# Patient Record
Sex: FEMALE | Race: WHITE | Employment: OTHER | URBAN - METROPOLITAN AREA
[De-identification: names, ages, dates, MRNs, and addresses within clinical notes are randomized per-mention and may not be internally consistent; named-entity substitution may affect disease eponyms.]

---

## 2020-06-26 ENCOUNTER — HOSPITAL ENCOUNTER (OUTPATIENT)
Dept: GENERAL RADIOLOGY | Facility: HOSPITAL | Age: 80
Discharge: HOME | End: 2020-06-26
Attending: PHYSICAL MEDICINE & REHABILITATION
Payer: MEDICARE

## 2020-06-26 ENCOUNTER — OFFICE VISIT (OUTPATIENT)
Dept: NEUROLOGY | Facility: CLINIC | Age: 80
End: 2020-06-26
Payer: MEDICARE

## 2020-06-26 VITALS — HEIGHT: 68 IN | WEIGHT: 185 LBS | BODY MASS INDEX: 28.04 KG/M2

## 2020-06-26 DIAGNOSIS — M53.3 SI (SACROILIAC) JOINT DYSFUNCTION: ICD-10-CM

## 2020-06-26 DIAGNOSIS — I10 ESSENTIAL HYPERTENSION: ICD-10-CM

## 2020-06-26 DIAGNOSIS — M47.816 LUMBAR SPONDYLOSIS: ICD-10-CM

## 2020-06-26 DIAGNOSIS — M53.3 SI (SACROILIAC) JOINT DYSFUNCTION: Primary | ICD-10-CM

## 2020-06-26 DIAGNOSIS — M51.37 DDD (DEGENERATIVE DISC DISEASE), LUMBOSACRAL: ICD-10-CM

## 2020-06-26 DIAGNOSIS — G62.9 NEUROPATHY: ICD-10-CM

## 2020-06-26 DIAGNOSIS — M54.59 MECHANICAL LOW BACK PAIN: ICD-10-CM

## 2020-06-26 DIAGNOSIS — M79.10 MYALGIA: ICD-10-CM

## 2020-06-26 PROCEDURE — 72110 X-RAY EXAM L-2 SPINE 4/>VWS: CPT | Performed by: PHYSICAL MEDICINE & REHABILITATION

## 2020-06-26 PROCEDURE — 99204 OFFICE O/P NEW MOD 45 MIN: CPT | Performed by: PHYSICAL MEDICINE & REHABILITATION

## 2020-06-26 RX ORDER — METOPROLOL SUCCINATE 50 MG/1
1 TABLET, EXTENDED RELEASE ORAL DAILY
COMMUNITY

## 2020-06-26 RX ORDER — VALSARTAN AND HYDROCHLOROTHIAZIDE 160; 25 MG/1; MG/1
1 TABLET ORAL DAILY
COMMUNITY

## 2020-06-26 RX ORDER — ASPIRIN 81 MG/1
81 TABLET ORAL DAILY
COMMUNITY

## 2020-06-26 RX ORDER — GABAPENTIN 100 MG/1
2 CAPSULE ORAL 2 TIMES DAILY
COMMUNITY

## 2020-06-26 RX ORDER — HYDROCODONE BITARTRATE AND ACETAMINOPHEN 10; 325 MG/1; MG/1
1 TABLET ORAL AS NEEDED
COMMUNITY

## 2020-06-26 NOTE — H&P
[address] [place] [address] H&P    Requesting Physician: Dr. Dunlap July    Chief Complaint (Reason for Visit):  Patient presents with:  Back Pain: Patient presents today c/o pain in left lower back going down the back HYSTERECTOMY     • REPAIR COMPL ROTATOR CUFF AVULSN,CHR Bilateral         FAMILY HISTORY:   History reviewed. No pertinent family history.     SOCIAL HISTORY:   Social History    Occupational History      Not on file    Tobacco Use      Smoking status: [facility] follow 2 step commands, comprehention intact, spontaneous speech intact  Motor:    Musculoskeletal:    LUMBAR SPINE:  Inspection: no erythema, swelling, or obvious deformity.   Their iliac crest and shoulder heights are symmetrical.  Postural exam reveals k were provided as documented in AVS summary. The patient was in agreement with the assessment and plan. All questions were answered. There were no barriers to learning.        SI (sacroiliac) joint dysfunction  (primary encounter diagnosis)  Myalgia  Mech

## 2020-06-26 NOTE — PATIENT INSTRUCTIONS
1) Continue ibuprofen 2 tablets daily for pain and use Norco as needed.    2) Begin formal physical therapy at the [facility]  3) Get XR of the lumbar spine on your way out  4) My office will call you to schedule the LEFT SI joint and myofas

## 2020-06-29 ENCOUNTER — TELEPHONE (OUTPATIENT)
Dept: NEUROLOGY | Facility: CLINIC | Age: 80
End: 2020-06-29

## 2020-06-29 NOTE — TELEPHONE ENCOUNTER
[employer] for authorization of approval for LEFT SI joint and myofacial trigger point CSI under ultrasound guidance cpt codes , 14805. Authorization is not required. Will inform Nursing.

## 2020-07-15 ENCOUNTER — OFFICE VISIT (OUTPATIENT)
Dept: PHYSICAL THERAPY | Facility: HOSPITAL | Age: 80
End: 2020-07-15
Attending: PHYSICAL MEDICINE & REHABILITATION
Payer: MEDICARE

## 2020-07-15 DIAGNOSIS — G62.9 NEUROPATHY: ICD-10-CM

## 2020-07-15 DIAGNOSIS — M53.3 SI (SACROILIAC) JOINT DYSFUNCTION: ICD-10-CM

## 2020-07-15 DIAGNOSIS — M54.59 MECHANICAL LOW BACK PAIN: ICD-10-CM

## 2020-07-15 DIAGNOSIS — M51.37 DDD (DEGENERATIVE DISC DISEASE), LUMBOSACRAL: ICD-10-CM

## 2020-07-15 DIAGNOSIS — M79.10 MYALGIA: ICD-10-CM

## 2020-07-15 DIAGNOSIS — M47.816 LUMBAR SPONDYLOSIS: ICD-10-CM

## 2020-07-15 DIAGNOSIS — I10 ESSENTIAL HYPERTENSION: ICD-10-CM

## 2020-07-15 PROCEDURE — 97162 PT EVAL MOD COMPLEX 30 MIN: CPT

## 2020-07-15 PROCEDURE — 97110 THERAPEUTIC EXERCISES: CPT

## 2020-07-15 NOTE — PROGRESS NOTES
LUMBAR SPINE EVALUATION:   Referring Physician: Dr. Priscilla Momin  Diagnosis: SI (sacroiliac) joint dysfunction (M53.3)  Myalgia (M79.10)  Mechanical low back pain (M54.5)  Neuropathy (G62.9)  Essential hypertension (I10)  Lumbar spondylosis (M47.816)  DDD (de levoscoliosis mid lumbar spine. Chronic moderately severe compression deformity of L1 with stable retro listhesis of L1 in relation L2. Scattered spondylosis.   Mild anterolisthesis of L4 in relation to L5 which worsens during flexion and     Screening Marlo Goltz Major Limitation: 75+% Limited Moderate Limitation: 50% Limited Min Limitation: 25% Limited No Limitation Comments   Flexion X       Extension  X      R Side Glide  X      L Side Glide  X                Myotomes Left Right   Hip Flexion (L1-2) 4+/5 4/5   Q factors/comorbidities, 4+ body structures involved/activity limitations, and evolving symptoms including changing pain levels. PLAN OF CARE:    Goals: To be met in 90 days  1.  Colin Kingsley will demonstrate score of 10/100 on Oswestry to demonstrate retu

## 2020-07-17 ENCOUNTER — OFFICE VISIT (OUTPATIENT)
Dept: PHYSICAL THERAPY | Facility: HOSPITAL | Age: 80
End: 2020-07-17
Attending: PHYSICAL MEDICINE & REHABILITATION
Payer: MEDICARE

## 2020-07-17 PROCEDURE — 97110 THERAPEUTIC EXERCISES: CPT

## 2020-07-17 NOTE — PROGRESS NOTES
Dx: SI (sacroiliac) joint dysfunction (M53.3)  Myalgia (M79.10)  Mechanical low back pain (M54.5)  Neuropathy (G62.9)  Essential hypertension (I10)  Lumbar spondylosis (M47.816)  DDD (degenerative disc disease), lumbosacral (M51.37)     Authorized # of [employer] will demonstrate score of 10/100 on Oswestry to demonstrate return to maximum functional performance. 2. Paolo Humphries will report she is able to ascend/descend the stairs in a recipricol pattern to improve safety and decrease fall risk.   [address]

## 2020-07-20 ENCOUNTER — TELEPHONE (OUTPATIENT)
Dept: NEUROLOGY | Facility: CLINIC | Age: 80
End: 2020-07-20

## 2020-07-20 ENCOUNTER — OFFICE VISIT (OUTPATIENT)
Dept: PHYSICAL THERAPY | Facility: HOSPITAL | Age: 80
End: 2020-07-20
Attending: PHYSICAL MEDICINE & REHABILITATION
Payer: MEDICARE

## 2020-07-20 PROCEDURE — 97140 MANUAL THERAPY 1/> REGIONS: CPT

## 2020-07-20 PROCEDURE — 97110 THERAPEUTIC EXERCISES: CPT

## 2020-07-20 NOTE — PROGRESS NOTES
Dx: SI (sacroiliac) joint dysfunction (M53.3)  Myalgia (M79.10)  Mechanical low back pain (M54.5)  Neuropathy (G62.9)  Essential hypertension (I10)  Lumbar spondylosis (M47.816)  DDD (degenerative disc disease), lumbosacral (M51.37)     Authorized # of [employer] fall risk. [address] will report she is able to sit for 20 mins or longer with 2/10 pain or less to improve driving performance.     Plan:  Continue with current poc with focus on lumbar and SI interventions    Charges: Kalen 2 manual 1 Total Timed Alexandro

## 2020-07-21 NOTE — TELEPHONE ENCOUNTER
Spoke to patient. She states that for the past 6-7 weeks she has been having pain in left shoulder. She denies injury. She has not been evaluated by Dr. Jose Sharpe for her shoulder. She states that previously she did see ortho but it has been a while.  Explained

## 2020-07-23 ENCOUNTER — OFFICE VISIT (OUTPATIENT)
Dept: PHYSICAL THERAPY | Facility: HOSPITAL | Age: 80
End: 2020-07-23
Attending: PHYSICAL MEDICINE & REHABILITATION
Payer: MEDICARE

## 2020-07-23 PROCEDURE — 97140 MANUAL THERAPY 1/> REGIONS: CPT

## 2020-07-23 PROCEDURE — 97110 THERAPEUTIC EXERCISES: CPT

## 2020-07-27 ENCOUNTER — OFFICE VISIT (OUTPATIENT)
Dept: PHYSICAL THERAPY | Facility: HOSPITAL | Age: 80
End: 2020-07-27
Attending: PHYSICAL MEDICINE & REHABILITATION
Payer: MEDICARE

## 2020-07-27 ENCOUNTER — OFFICE VISIT (OUTPATIENT)
Dept: NEUROLOGY | Facility: CLINIC | Age: 80
End: 2020-07-27
Payer: MEDICARE

## 2020-07-27 DIAGNOSIS — M54.59 MECHANICAL LOW BACK PAIN: ICD-10-CM

## 2020-07-27 DIAGNOSIS — M79.10 MYALGIA: ICD-10-CM

## 2020-07-27 DIAGNOSIS — M47.816 LUMBAR SPONDYLOSIS: ICD-10-CM

## 2020-07-27 DIAGNOSIS — M51.37 DDD (DEGENERATIVE DISC DISEASE), LUMBOSACRAL: ICD-10-CM

## 2020-07-27 DIAGNOSIS — M53.3 SI (SACROILIAC) JOINT DYSFUNCTION: Primary | ICD-10-CM

## 2020-07-27 DIAGNOSIS — G62.9 NEUROPATHY: ICD-10-CM

## 2020-07-27 DIAGNOSIS — I10 ESSENTIAL HYPERTENSION: ICD-10-CM

## 2020-07-27 PROCEDURE — 20553 NJX 1/MLT TRIGGER POINTS 3/>: CPT | Performed by: PHYSICAL MEDICINE & REHABILITATION

## 2020-07-27 PROCEDURE — 97140 MANUAL THERAPY 1/> REGIONS: CPT

## 2020-07-27 PROCEDURE — 97110 THERAPEUTIC EXERCISES: CPT

## 2020-07-27 PROCEDURE — 76942 ECHO GUIDE FOR BIOPSY: CPT | Performed by: PHYSICAL MEDICINE & REHABILITATION

## 2020-07-27 RX ORDER — LIDOCAINE HYDROCHLORIDE 10 MG/ML
2.5 INJECTION, SOLUTION INFILTRATION; PERINEURAL ONCE
Status: COMPLETED | OUTPATIENT
Start: 2020-07-27 | End: 2020-07-27

## 2020-07-27 RX ORDER — LIDOCAINE HYDROCHLORIDE 10 MG/ML
4 INJECTION, SOLUTION INFILTRATION; PERINEURAL ONCE
Status: COMPLETED | OUTPATIENT
Start: 2020-07-27 | End: 2020-07-27

## 2020-07-27 RX ORDER — TRIAMCINOLONE ACETONIDE 40 MG/ML
40 INJECTION, SUSPENSION INTRA-ARTICULAR; INTRAMUSCULAR ONCE
Status: COMPLETED | OUTPATIENT
Start: 2020-07-27 | End: 2020-07-27

## 2020-07-27 NOTE — PROCEDURES
[address]   Sacroiliac Joint and Myofascial Trigger P  oint Injection Procedure Note    CHIEF COMPLAINT:  Patient presents with:   Injection: Pt presents for Left SI joint steroid injection and myofascial tr removed and hemostasis obtained. The patient tolerated the procedure well and was able to report that the pain decreased from a 4/10 preprocedure to a 0/10 postprocedure in the SI joint. Patient verbalized understanding of assessment and plan.

## 2020-07-27 NOTE — PROGRESS NOTES
Dx: SI (sacroiliac) joint dysfunction (M53.3)  Myalgia (M79.10)  Mechanical low back pain (M54.5)  Neuropathy (G62.9)  Essential hypertension (I10)  Lumbar spondylosis (M47.816)  DDD (degenerative disc disease), lumbosacral (M51.37)     Authorized # of [employer] Tianna Muir will report she is able to ascend/descend the stairs in a recipricol pattern to improve safety and decrease fall risk. [address] will report she is able to sit for 20 mins or longer with 2/10 pain or less to improve driving performance.     Plan

## 2020-07-30 ENCOUNTER — OFFICE VISIT (OUTPATIENT)
Dept: PHYSICAL THERAPY | Facility: HOSPITAL | Age: 80
End: 2020-07-30
Attending: PHYSICAL MEDICINE & REHABILITATION
Payer: MEDICARE

## 2020-07-30 PROCEDURE — 97110 THERAPEUTIC EXERCISES: CPT

## 2020-07-30 NOTE — PROGRESS NOTES
Dx: SI (sacroiliac) joint dysfunction (M53.3)  Myalgia (M79.10)  Mechanical low back pain (M54.5)  Neuropathy (G62.9)  Essential hypertension (I10)  Lumbar spondylosis (M47.816)  DDD (degenerative disc disease), lumbosacral (M51.37)     Authorized # of [employer] Pam Freire will report she is able to ascend/descend the stairs in a recipricol pattern to improve safety and decrease fall risk. [address] will report she is able to sit for 20 mins or longer with 2/10 pain or less to improve driving performance.     Pl

## 2020-08-03 ENCOUNTER — APPOINTMENT (OUTPATIENT)
Dept: PHYSICAL THERAPY | Facility: HOSPITAL | Age: 80
End: 2020-08-03
Attending: PHYSICAL MEDICINE & REHABILITATION
Payer: MEDICARE

## 2020-08-05 ENCOUNTER — OFFICE VISIT (OUTPATIENT)
Dept: PHYSICAL THERAPY | Facility: HOSPITAL | Age: 80
End: 2020-08-05
Attending: PHYSICAL MEDICINE & REHABILITATION
Payer: MEDICARE

## 2020-08-05 PROCEDURE — 97110 THERAPEUTIC EXERCISES: CPT

## 2020-08-05 NOTE — PROGRESS NOTES
Dx: SI (sacroiliac) joint dysfunction (M53.3)  Myalgia (M79.10)  Mechanical low back pain (M54.5)  Neuropathy (G62.9)  Essential hypertension (I10)  Lumbar spondylosis (M47.816)  DDD (degenerative disc disease), lumbosacral (M51.37)     Authorized # of [employer] return to maximum functional performance. 2. Nell  will report she is able to ascend/descend the stairs in a recipricol pattern to improve safety and decrease fall risk.   [address] will report she is able to sit for 20 mins or longer with 2/

## 2020-08-07 ENCOUNTER — OFFICE VISIT (OUTPATIENT)
Dept: PHYSICAL THERAPY | Facility: HOSPITAL | Age: 80
End: 2020-08-07
Attending: PHYSICAL MEDICINE & REHABILITATION
Payer: MEDICARE

## 2020-08-07 PROCEDURE — 97110 THERAPEUTIC EXERCISES: CPT

## 2020-08-07 NOTE — PROGRESS NOTES
Dx: SI (sacroiliac) joint dysfunction (M53.3)  Myalgia (M79.10)  Mechanical low back pain (M54.5)  Neuropathy (G62.9)  Essential hypertension (I10)  Lumbar spondylosis (M47.816)  DDD (degenerative disc disease), lumbosacral (M51.37)     Authorized # of Juan attempt progression next session if her RLE pain stays improved. Rena Edouard reports less grabbing at her L buttock and that her SI pain has improved. Goals: To be met in 90 days  1.  Rena Edouard will demonstrate score of 10/100 on Oswestry to selinat

## 2020-08-10 ENCOUNTER — APPOINTMENT (OUTPATIENT)
Dept: PHYSICAL THERAPY | Facility: HOSPITAL | Age: 80
End: 2020-08-10
Attending: PHYSICAL MEDICINE & REHABILITATION
Payer: MEDICARE

## 2020-08-10 ENCOUNTER — MED REC SCAN ONLY (OUTPATIENT)
Dept: NEUROLOGY | Facility: CLINIC | Age: 80
End: 2020-08-10

## 2020-08-11 ENCOUNTER — APPOINTMENT (OUTPATIENT)
Dept: PHYSICAL THERAPY | Facility: HOSPITAL | Age: 80
End: 2020-08-11
Attending: PHYSICAL MEDICINE & REHABILITATION
Payer: MEDICARE

## 2020-08-13 ENCOUNTER — OFFICE VISIT (OUTPATIENT)
Dept: PHYSICAL THERAPY | Facility: HOSPITAL | Age: 80
End: 2020-08-13
Attending: PHYSICAL MEDICINE & REHABILITATION
Payer: MEDICARE

## 2020-08-13 PROCEDURE — 97110 THERAPEUTIC EXERCISES: CPT

## 2020-08-13 NOTE — PROGRESS NOTES
Dx: SI (sacroiliac) joint dysfunction (M53.3)  Myalgia (M79.10)  Mechanical low back pain (M54.5)  Neuropathy (G62.9)  Essential hypertension (I10)  Lumbar spondylosis (M47.816)  DDD (degenerative disc disease), lumbosacral (M51.37)     Authorized # of [employer] Day    BRIDGING ELASTIC BAND ABDUCTION -  Repeat 15 Times, Hold 1 Second(s), Complete 2 Sets, Perform 1 Times a Day    Sit to Stand -  Repeat 10 Times, Hold 1 Second(s), Complete 2 Sets, Perform 1 Times a Day    Assessment:   Madelyn Rodriguez reports positive

## 2020-08-18 ENCOUNTER — OFFICE VISIT (OUTPATIENT)
Dept: PHYSICAL THERAPY | Facility: HOSPITAL | Age: 80
End: 2020-08-18
Attending: PHYSICAL MEDICINE & REHABILITATION
Payer: MEDICARE

## 2020-08-18 PROCEDURE — 97110 THERAPEUTIC EXERCISES: CPT

## 2020-08-18 NOTE — PROGRESS NOTES
LUMBAR SPINE EVALUATION:   Referring Physician: Dr. Bernard Body  Diagnosis: SI (sacroiliac) joint dysfunction (M53.3)  Myalgia (M79.10)  Mechanical low back pain (M54.5)  Neuropathy (G62.9)  Essential hypertension (I10)  Lumbar spondylosis (M47.816)  DDD (de mins (current no change), pressure on posterior thigh with sitting (current no change)   Disturbed Sleep: Pt denies      Past Medical History:   Diagnosis Date   • Essential hypertension    • Neuropathy      Past Surgical History:   Procedure Laterality Da deformity  Lateral Shift: None noted  Correction of Posture: Better    Gait Deviations: R trunk lean, forward FLXed posture, decreased gait speed    AROM Major Limitation: 75+% Limited Moderate Limitation: 50% Limited Min Limitation: 25% Limited No Limitat Times a Day    Charges:  Therex 3  Total Timed Treatment: 45 min     Total Treatment Time: 45 min     Based on clinical rationale and outcome measures, this evaluation involved Moderate Complexity decision making due to 3+ personal factors/comorbidities, 4+

## 2020-09-10 ENCOUNTER — TELEPHONE (OUTPATIENT)
Dept: NEUROLOGY | Facility: CLINIC | Age: 80
End: 2020-09-10

## 2020-09-10 ENCOUNTER — OFFICE VISIT (OUTPATIENT)
Dept: NEUROLOGY | Facility: CLINIC | Age: 80
End: 2020-09-10
Payer: MEDICARE

## 2020-09-10 VITALS — WEIGHT: 185 LBS | BODY MASS INDEX: 28.04 KG/M2 | HEIGHT: 68 IN

## 2020-09-10 DIAGNOSIS — M53.3 SI (SACROILIAC) JOINT DYSFUNCTION: Primary | ICD-10-CM

## 2020-09-10 DIAGNOSIS — M54.59 MECHANICAL LOW BACK PAIN: ICD-10-CM

## 2020-09-10 DIAGNOSIS — M43.16 SPONDYLOLISTHESIS OF LUMBAR REGION: ICD-10-CM

## 2020-09-10 DIAGNOSIS — M51.26 BULGE OF LUMBAR DISC WITHOUT MYELOPATHY: ICD-10-CM

## 2020-09-10 DIAGNOSIS — M51.16 LUMBAR DISC HERNIATION WITH RADICULOPATHY: ICD-10-CM

## 2020-09-10 DIAGNOSIS — M47.816 FACET SYNDROME, LUMBAR: ICD-10-CM

## 2020-09-10 DIAGNOSIS — M47.816 LUMBAR SPONDYLOSIS: ICD-10-CM

## 2020-09-10 DIAGNOSIS — M48.061 LUMBAR FORAMINAL STENOSIS: ICD-10-CM

## 2020-09-10 DIAGNOSIS — M51.37 DDD (DEGENERATIVE DISC DISEASE), LUMBOSACRAL: ICD-10-CM

## 2020-09-10 DIAGNOSIS — M54.59 LUMBAR TRIGGER POINT SYNDROME: ICD-10-CM

## 2020-09-10 DIAGNOSIS — G62.9 NEUROPATHY: ICD-10-CM

## 2020-09-10 DIAGNOSIS — M79.10 MYALGIA: ICD-10-CM

## 2020-09-10 PROBLEM — M51.36 BULGE OF LUMBAR DISC WITHOUT MYELOPATHY: Status: ACTIVE | Noted: 2020-09-10

## 2020-09-10 PROCEDURE — 99214 OFFICE O/P EST MOD 30 MIN: CPT | Performed by: PHYSICAL MEDICINE & REHABILITATION

## 2020-09-10 PROCEDURE — 3008F BODY MASS INDEX DOCD: CPT | Performed by: PHYSICAL MEDICINE & REHABILITATION

## 2020-09-10 NOTE — PROGRESS NOTES
[address]  Progress Note    CHIEF COMPLAINT:  Patient presents with:  Low Back Pain: Pt states that shes following up after the procedure.  She also states that the injection didnt help with her pain       H daily.     • HYDROcodone-acetaminophen  MG Oral Tab 1 tablet as needed. • Metoprolol Succinate ER 50 MG Oral Tablet 24 Hr 1 tablet daily. • Valsartan-hydroCHLOROthiazide 160-25 MG Oral Tab 1 tablet daily.      • aspirin 81 MG Oral Tab EC Take lower extremities except 4 out of 5 with left-sided great toe extension (L5) and plantar flexion (S1)  Sensation: Intact to light touch in all dermatomes of the lower extremities except decreased sensation to light touch over the left L5  Reflexes: 1/4 at or extension. OTHER: Negative. Impression: CONCLUSION:   1. Mild levoscoliosis mid lumbar spine. Chronic moderately severe compression deformity of L1 with stable retro listhesis of L1 in relation L2. Scattered spondylosis.   Mild anteroli [place]

## 2020-09-10 NOTE — TELEPHONE ENCOUNTER
[employer] for authorization of approval for MRI L-spine wo cpt code 50003. Approval was given with Authorization Number: [id number] effective 09/10/20 to 03/09/21. Will call Pt. To inform. L/m advising of approval. Can proceed with scheduling appt.

## 2020-09-16 ENCOUNTER — HOSPITAL ENCOUNTER (OUTPATIENT)
Dept: MRI IMAGING | Facility: HOSPITAL | Age: 80
Discharge: HOME | End: 2020-09-16
Attending: PHYSICAL MEDICINE & REHABILITATION
Payer: MEDICARE

## 2020-09-16 DIAGNOSIS — G62.9 NEUROPATHY: ICD-10-CM

## 2020-09-16 DIAGNOSIS — M51.37 DDD (DEGENERATIVE DISC DISEASE), LUMBOSACRAL: ICD-10-CM

## 2020-09-16 DIAGNOSIS — M48.061 LUMBAR FORAMINAL STENOSIS: ICD-10-CM

## 2020-09-16 DIAGNOSIS — M53.3 SI (SACROILIAC) JOINT DYSFUNCTION: ICD-10-CM

## 2020-09-16 DIAGNOSIS — M47.816 LUMBAR SPONDYLOSIS: ICD-10-CM

## 2020-09-16 DIAGNOSIS — M47.816 FACET SYNDROME, LUMBAR: ICD-10-CM

## 2020-09-16 DIAGNOSIS — M54.59 MECHANICAL LOW BACK PAIN: ICD-10-CM

## 2020-09-16 DIAGNOSIS — M54.59 LUMBAR TRIGGER POINT SYNDROME: ICD-10-CM

## 2020-09-16 DIAGNOSIS — M79.10 MYALGIA: ICD-10-CM

## 2020-09-16 DIAGNOSIS — M51.16 LUMBAR DISC HERNIATION WITH RADICULOPATHY: ICD-10-CM

## 2020-09-16 DIAGNOSIS — M43.16 SPONDYLOLISTHESIS OF LUMBAR REGION: ICD-10-CM

## 2020-09-16 DIAGNOSIS — M51.26 BULGE OF LUMBAR DISC WITHOUT MYELOPATHY: ICD-10-CM

## 2020-09-16 PROCEDURE — 72148 MRI LUMBAR SPINE W/O DYE: CPT | Performed by: PHYSICAL MEDICINE & REHABILITATION

## 2020-09-22 ENCOUNTER — TELEPHONE (OUTPATIENT)
Dept: NEUROLOGY | Facility: CLINIC | Age: 80
End: 2020-09-22

## 2020-09-22 ENCOUNTER — OFFICE VISIT (OUTPATIENT)
Dept: NEUROLOGY | Facility: CLINIC | Age: 80
End: 2020-09-22
Payer: MEDICARE

## 2020-09-22 VITALS — BODY MASS INDEX: 28.04 KG/M2 | HEIGHT: 68 IN | WEIGHT: 185 LBS

## 2020-09-22 DIAGNOSIS — M54.59 LUMBAR TRIGGER POINT SYNDROME: ICD-10-CM

## 2020-09-22 DIAGNOSIS — M47.816 LUMBAR SPONDYLOSIS: ICD-10-CM

## 2020-09-22 DIAGNOSIS — M48.061 LUMBAR FORAMINAL STENOSIS: ICD-10-CM

## 2020-09-22 DIAGNOSIS — M47.816 FACET SYNDROME, LUMBAR: ICD-10-CM

## 2020-09-22 DIAGNOSIS — M79.10 MYALGIA: ICD-10-CM

## 2020-09-22 DIAGNOSIS — M51.16 LUMBAR DISC HERNIATION WITH RADICULOPATHY: Primary | ICD-10-CM

## 2020-09-22 DIAGNOSIS — M48.062 SPINAL STENOSIS OF LUMBAR REGION WITH NEUROGENIC CLAUDICATION: ICD-10-CM

## 2020-09-22 DIAGNOSIS — M54.59 MECHANICAL LOW BACK PAIN: ICD-10-CM

## 2020-09-22 DIAGNOSIS — M51.26 BULGE OF LUMBAR DISC WITHOUT MYELOPATHY: ICD-10-CM

## 2020-09-22 DIAGNOSIS — M51.37 DDD (DEGENERATIVE DISC DISEASE), LUMBOSACRAL: ICD-10-CM

## 2020-09-22 DIAGNOSIS — M43.16 SPONDYLOLISTHESIS OF LUMBAR REGION: ICD-10-CM

## 2020-09-22 PROCEDURE — 3008F BODY MASS INDEX DOCD: CPT | Performed by: PHYSICAL MEDICINE & REHABILITATION

## 2020-09-22 PROCEDURE — 99214 OFFICE O/P EST MOD 30 MIN: CPT | Performed by: PHYSICAL MEDICINE & REHABILITATION

## 2020-09-22 NOTE — TELEPHONE ENCOUNTER
Patient has been scheduled for a LEFt L5 and LEFt S1 TFESI under local anesthesia on 10/12/20 at the [facility]. Medications and allergies reviewed.  Patient informed to hold aspirins, nsaids, blood thinners, multivitamins, vitamin E and fish oils 3-7 days prior

## 2020-09-22 NOTE — TELEPHONE ENCOUNTER
[employer] for authorization of approval for LEFt L5 and LEFt S1 TFESI cpt codes G7122068, [id number], [id number]. Approval was given with Authorization Number: [id number] for one unit/DOS effective 09/22/20 to 03/21/21. Will inform Nursing.

## 2020-09-22 NOTE — PROGRESS NOTES
[address]  Progress Note    CHIEF COMPLAINT:  Patient presents with:  Low Back Pain: Pt states that shes here today to go ovwer MRI results       History of Present Illness:   The patient is a 79year old ri as needed. • Metoprolol Succinate ER 50 MG Oral Tablet 24 Hr 1 tablet daily. • Valsartan-hydroCHLOROthiazide 160-25 MG Oral Tab 1 tablet daily. • aspirin 81 MG Oral Tab EC Take 81 mg by mouth daily.          ALLERGIES:     Codeine extension (L5) and plantar flexion (S1)  Sensation: Intact to light touch in all dermatomes of the lower extremities except decreased sensation to light touch over the left L5  Reflexes: 1/4 at L4 and S1  Facet Loading: Pain with extension and rotation to moderate narrowing of the canal.  No increased marrow edema to suggest an acute fracture. CORD/CAUDA EQUINA: Normal caliber, contour, and signal intensity. OTHER: Negative.      LUMBAR DISC LEVELS:  L1-L2: Mild hypertrophic changes of the facet joints a posterior annular disc fissures at L4-L5 and L5-S1.      Dictated by (CST): Libia Urban MD on 9/17/2020 at 9:40 AM       Finalized by (CST): Libia Urban MD on 9/17/2020 at 10:05 AM              ASSESSMENT AND PLAN:  The patient is a 78-year-old female who

## 2020-09-22 NOTE — PATIENT INSTRUCTIONS
1) My office will call you to schedule the LEFT L5 and LEFT S1 TFESI under local anesthesia once the procedure is approved by your insurance carrier. 2) Follow up with me 2 weeks after the procedure. This can be done virtually.    3) If no improvement wi

## 2020-09-30 ENCOUNTER — TELEPHONE (OUTPATIENT)
Dept: NEUROLOGY | Facility: CLINIC | Age: 80
End: 2020-09-30

## 2020-10-12 ENCOUNTER — OFFICE VISIT (OUTPATIENT)
Dept: SURGERY | Facility: CLINIC | Age: 80
End: 2020-10-12

## 2020-10-12 DIAGNOSIS — M51.26 BULGE OF LUMBAR DISC WITHOUT MYELOPATHY: ICD-10-CM

## 2020-10-12 DIAGNOSIS — M47.816 LUMBAR SPONDYLOSIS: ICD-10-CM

## 2020-10-12 DIAGNOSIS — M51.37 DDD (DEGENERATIVE DISC DISEASE), LUMBOSACRAL: ICD-10-CM

## 2020-10-12 DIAGNOSIS — M51.16 LUMBAR DISC HERNIATION WITH RADICULOPATHY: Primary | ICD-10-CM

## 2020-10-12 DIAGNOSIS — M48.061 LUMBAR FORAMINAL STENOSIS: ICD-10-CM

## 2020-10-12 PROCEDURE — 64483 NJX AA&/STRD TFRM EPI L/S 1: CPT | Performed by: PHYSICAL MEDICINE & REHABILITATION

## 2020-10-12 PROCEDURE — 64484 NJX AA&/STRD TFRM EPI L/S EA: CPT | Performed by: PHYSICAL MEDICINE & REHABILITATION

## 2020-10-12 NOTE — PROCEDURES
[address]    2-LEVEL LUMBAR TRANSFORAMINAL   NAME:  Alejandro Ruiz    MR #:    [id number] :  1940     PHYSICIAN:  Heena Valera        Operative Report    DATE OF PROCEDURE: 10/12/2020   PREOPERATIVE DIAGNOSES: 1.  Lumbar disc he aspirated. Then, the patient was injected with a 4 cc solution of 2 cc of 6 mg/cc of Betamethasone and 2 cc of 1% PF lidocaine without epinephrine at each site. After this, the needles were removed. Each site was cleaned. Band-Aids were applied.   The p

## 2020-10-14 ENCOUNTER — TELEPHONE (OUTPATIENT)
Dept: NEUROLOGY | Facility: CLINIC | Age: 80
End: 2020-10-14

## 2020-10-14 NOTE — TELEPHONE ENCOUNTER
Patient calling back with an update. State still feeling some pain when walking and when moving the wrong way, gets the \"grabbing sensation\". But there is no radiating pain down the legs anymore.    If patient is not moving there is no pain and prior to i

## 2020-10-14 NOTE — TELEPHONE ENCOUNTER
Called patient for a follow up after injection on Monday 10/14/2020. Left a message to call the office back with an update how she is doing.

## 2020-10-27 ENCOUNTER — OFFICE VISIT (OUTPATIENT)
Dept: NEUROLOGY | Facility: CLINIC | Age: 80
End: 2020-10-27
Payer: MEDICARE

## 2020-10-27 VITALS — HEIGHT: 68 IN | BODY MASS INDEX: 28.04 KG/M2 | WEIGHT: 185 LBS

## 2020-10-27 DIAGNOSIS — M47.816 FACET SYNDROME, LUMBAR: ICD-10-CM

## 2020-10-27 DIAGNOSIS — M54.59 MECHANICAL LOW BACK PAIN: Primary | ICD-10-CM

## 2020-10-27 DIAGNOSIS — M51.37 DDD (DEGENERATIVE DISC DISEASE), LUMBOSACRAL: ICD-10-CM

## 2020-10-27 DIAGNOSIS — M51.26 BULGE OF LUMBAR DISC WITHOUT MYELOPATHY: ICD-10-CM

## 2020-10-27 DIAGNOSIS — M54.59 LUMBAR TRIGGER POINT SYNDROME: ICD-10-CM

## 2020-10-27 DIAGNOSIS — M48.061 LUMBAR FORAMINAL STENOSIS: ICD-10-CM

## 2020-10-27 DIAGNOSIS — G62.9 NEUROPATHY: ICD-10-CM

## 2020-10-27 DIAGNOSIS — M53.3 SI (SACROILIAC) JOINT DYSFUNCTION: ICD-10-CM

## 2020-10-27 DIAGNOSIS — M47.816 LUMBAR SPONDYLOSIS: ICD-10-CM

## 2020-10-27 DIAGNOSIS — M79.10 MYALGIA: ICD-10-CM

## 2020-10-27 DIAGNOSIS — M51.16 LUMBAR DISC HERNIATION WITH RADICULOPATHY: ICD-10-CM

## 2020-10-27 DIAGNOSIS — M43.16 SPONDYLOLISTHESIS OF LUMBAR REGION: ICD-10-CM

## 2020-10-27 DIAGNOSIS — M48.062 SPINAL STENOSIS OF LUMBAR REGION WITH NEUROGENIC CLAUDICATION: ICD-10-CM

## 2020-10-27 DIAGNOSIS — I10 ESSENTIAL HYPERTENSION: ICD-10-CM

## 2020-10-27 PROCEDURE — 3008F BODY MASS INDEX DOCD: CPT | Performed by: PHYSICAL MEDICINE & REHABILITATION

## 2020-10-27 PROCEDURE — 99214 OFFICE O/P EST MOD 30 MIN: CPT | Performed by: PHYSICAL MEDICINE & REHABILITATION

## 2020-10-27 RX ORDER — A/SINGAPORE/GP1908/2015 IVR-180 (AN A/MICHIGAN/45/2015 (H1N1)PDM09-LIKE VIRUS, A/HONG KONG/4801/2014, NYMC X-263B (H3N2) (AN A/HONG KONG/4801/2014-LIKE VIRUS), AND B/BRISBANE/60/2008, WILD TYPE (A B/BRISBANE/60/2008-LIKE VIRUS) 15; 15; 15 UG/.5ML; UG/.5ML; UG/.5ML
0.5 INJECTION, SUSPENSION INTRAMUSCULAR SEE ADMIN INSTRUCTIONS
COMMUNITY
Start: 2020-10-09

## 2020-10-27 NOTE — PATIENT INSTRUCTIONS
My office will call you to schedule the LEFt L2-L5 under local anesthesia once the procedure is approved by your insurance carrier. Follow up with me virtually the week after. If no improvement, then we will repeat the epidrual injection.  Depending on imp

## 2020-10-27 NOTE — PROGRESS NOTES
[address]  Progress Note    CHIEF COMPLAINT:  Patient presents with:  Low Back Pain: LOV 10/1220 Pt states that shes following up after the injection and she didnt get any relief from it       History of Pr into the muscle See Admin Instructions. UNTIL FINISHED     • gabapentin 100 MG Oral Cap 2 capsules 2 (two) times daily. • HYDROcodone-acetaminophen  MG Oral Tab 1 tablet as needed.      • Metoprolol Succinate ER 50 MG Oral Tablet 24 Hr 1 tablet da with pain during extension as well as extension rotation to the left  Motor: 5/5 in all myotomes of the BILATERAL lower extremities except 4 out of 5 with left-sided great toe extension (L5) and plantar flexion (S1)  Sensation: Intact to light touch in all chronic compression   deformity of the L1 vertebral body with 50-75% loss of vertebral body height.   There is 0.6 cm of chronic appearing posterior osseous retropulsion which results in moderate narrowing of the canal.  No increased marrow edema to suggest there is severe narrowing of the canal and bilateral neural foramen. This has progressed since 6/14/2012. Bony encroachment upon the exiting bilateral L5 nerve roots. Small posterior annular disc fissures at L4-L5 and L5-S1.      Dictated by (CST): Sotero Fisher MD  Physical Medicine and Rehabilitation/Sports Medicine  MEDICAL CENTER [facility]

## 2020-10-28 ENCOUNTER — TELEPHONE (OUTPATIENT)
Dept: NEUROLOGY | Facility: CLINIC | Age: 80
End: 2020-10-28

## 2020-10-28 NOTE — TELEPHONE ENCOUNTER
LEFT L2,L3,L4, L5 medial branch blocks under local anesthesia CPT Code: 29825,46987,28906- pending review    Called [employer], spoke to Linell Mcburney who states Dr. Pawan Cabral is out of network in the member plan, request above will pend till eligibility is verified w

## 2020-11-02 NOTE — TELEPHONE ENCOUNTER
LEFT L2,L3,L4, L5 medial branch blocks under local anesthesia- APPROVED  Authorization # [id number] effective 11/01/2020 thru 4/30/2021.  Will will inform TRAVIS approved referrals  Received a faxed determination letter for request. Sent to scanning

## 2020-11-03 NOTE — TELEPHONE ENCOUNTER
Patient has been scheduled for a LEFT L2,L3,L4, L5 medial branch blocks under local anesthesia on 11/10/20 at the [facility]. Medications and allergies reviewed.  Patient informed to hold aspirins, nsaids, blood thinners, multivitamins, vitamin E and fish oils 3-

## 2020-11-06 NOTE — TELEPHONE ENCOUNTER
Spoke to patient and re-scheduled  LEFT L2,L3,L4, L5 medial branch blocks under local anesthesia from 11/10/20 to 11/20/20. Patient aware of injections instructions.   Form re-faxed to [facility] and confirmed receipt with Chelsea Keen

## 2020-11-20 ENCOUNTER — OFFICE VISIT (OUTPATIENT)
Dept: SURGERY | Facility: CLINIC | Age: 80
End: 2020-11-20

## 2020-11-20 DIAGNOSIS — M48.061 LUMBAR FORAMINAL STENOSIS: ICD-10-CM

## 2020-11-20 DIAGNOSIS — M79.10 MYALGIA: ICD-10-CM

## 2020-11-20 DIAGNOSIS — M99.9 BIOMECHANICAL LESION: ICD-10-CM

## 2020-11-20 DIAGNOSIS — M51.16 LUMBAR DISC HERNIATION WITH RADICULOPATHY: ICD-10-CM

## 2020-11-20 DIAGNOSIS — M54.59 MECHANICAL LOW BACK PAIN: Primary | ICD-10-CM

## 2020-11-20 DIAGNOSIS — M51.26 BULGE OF LUMBAR DISC WITHOUT MYELOPATHY: ICD-10-CM

## 2020-11-20 DIAGNOSIS — M47.816 LUMBAR SPONDYLOSIS: ICD-10-CM

## 2020-11-20 DIAGNOSIS — M47.816 FACET SYNDROME, LUMBAR: ICD-10-CM

## 2020-11-20 DIAGNOSIS — M48.062 SPINAL STENOSIS OF LUMBAR REGION WITH NEUROGENIC CLAUDICATION: ICD-10-CM

## 2020-11-20 DIAGNOSIS — M51.37 DDD (DEGENERATIVE DISC DISEASE), LUMBOSACRAL: ICD-10-CM

## 2020-11-20 PROCEDURE — 64495 INJ PARAVERT F JNT L/S 3 LEV: CPT | Performed by: PHYSICAL MEDICINE & REHABILITATION

## 2020-11-20 PROCEDURE — 64494 INJ PARAVERT F JNT L/S 2 LEV: CPT | Performed by: PHYSICAL MEDICINE & REHABILITATION

## 2020-11-20 PROCEDURE — 64493 INJ PARAVERT F JNT L/S 1 LEV: CPT | Performed by: PHYSICAL MEDICINE & REHABILITATION

## 2020-11-20 NOTE — PROCEDURES
[address]    LUMBAR MEDIAL BRANCH BLOCK   NAME:  Evette Casper    MR #:    [id number] :  1940     PHYSICIAN:  Giana Lopez        Operative Report    DATE OF PROCEDURE: 2020   PREOPERATIVE DIAGNOSES: 1.  Mechanical low ba was inserted and directed to the above stated sites. When they felt to be in good position, oblique fluoroscopy was used to confirm needle placement at the eye of the Jhony dog each level.    At this time, Omnipaque 240 contrast was used to obtain a good

## 2020-11-23 ENCOUNTER — TELEPHONE (OUTPATIENT)
Dept: NEUROLOGY | Facility: CLINIC | Age: 80
End: 2020-11-23

## 2020-11-23 DIAGNOSIS — M53.3 SI (SACROILIAC) JOINT DYSFUNCTION: ICD-10-CM

## 2020-11-23 DIAGNOSIS — M43.16 SPONDYLOLISTHESIS OF LUMBAR REGION: ICD-10-CM

## 2020-11-23 DIAGNOSIS — M48.062 SPINAL STENOSIS OF LUMBAR REGION WITH NEUROGENIC CLAUDICATION: ICD-10-CM

## 2020-11-23 DIAGNOSIS — M48.061 LUMBAR FORAMINAL STENOSIS: ICD-10-CM

## 2020-11-23 DIAGNOSIS — M51.26 BULGE OF LUMBAR DISC WITHOUT MYELOPATHY: ICD-10-CM

## 2020-11-23 DIAGNOSIS — M54.59 MECHANICAL LOW BACK PAIN: Primary | ICD-10-CM

## 2020-11-23 DIAGNOSIS — M51.16 LUMBAR DISC HERNIATION WITH RADICULOPATHY: ICD-10-CM

## 2020-11-23 DIAGNOSIS — M47.816 FACET SYNDROME, LUMBAR: ICD-10-CM

## 2020-11-23 DIAGNOSIS — M79.10 MYALGIA: ICD-10-CM

## 2020-11-23 DIAGNOSIS — M51.37 DDD (DEGENERATIVE DISC DISEASE), LUMBOSACRAL: ICD-10-CM

## 2020-11-23 DIAGNOSIS — M54.59 LUMBAR TRIGGER POINT SYNDROME: ICD-10-CM

## 2020-11-23 DIAGNOSIS — I10 ESSENTIAL HYPERTENSION: ICD-10-CM

## 2020-11-23 DIAGNOSIS — G62.9 NEUROPATHY: ICD-10-CM

## 2020-11-23 DIAGNOSIS — M47.816 LUMBAR SPONDYLOSIS: ICD-10-CM

## 2020-11-23 NOTE — TELEPHONE ENCOUNTER
Injection procedure done 11/20/2020    Patient states she has 0% improvement    Patient states pain has not change in left leg. Pain when walking/sitting/lifting things. She uses ice to help with pain.  She states she uses Advil for pain and took 2 on Sun

## 2020-12-02 ENCOUNTER — TELEPHONE (OUTPATIENT)
Dept: NEUROLOGY | Facility: CLINIC | Age: 80
End: 2020-12-02

## 2020-12-02 NOTE — TELEPHONE ENCOUNTER
Called patient back to discuss outcomes of medial branch blocks. As she did not have any relief from the procedure, I would recommend a left L5 and left S1 transforaminal epidural steroid injection again.   If that does not provide any relief then I would

## 2020-12-02 NOTE — TELEPHONE ENCOUNTER
[facility] of approval for LEFT L5 and LEFT S1 TFESI cpt codes 31668-LN, 95798-WX, [id number]. Approval was given with Authorization Number: [id number] for one unit/DOS effective 12/02/20 to 05/31/21. Will inform Nursing.

## 2020-12-02 NOTE — TELEPHONE ENCOUNTER
Patient has been scheduled for a LEFT L5 and LEFT S1 TFESI under local anesthesia on 12/08/20 at the [facility]. Medications and allergies reviewed.  Patient informed we will need verbal or written authorization from patients Primary Care Physician/Cardiologist t

## 2020-12-03 NOTE — TELEPHONE ENCOUNTER
Patient states she was exposed to [employer] on thanksgiving. She was tested yesterday but is experiencing no symptoms. Patient will let us know her test results on Monday 12/07/20.

## 2020-12-08 ENCOUNTER — OFFICE VISIT (OUTPATIENT)
Dept: SURGERY | Facility: CLINIC | Age: 80
End: 2020-12-08

## 2020-12-08 DIAGNOSIS — M48.061 LUMBAR FORAMINAL STENOSIS: ICD-10-CM

## 2020-12-08 DIAGNOSIS — M51.16 LUMBAR DISC HERNIATION WITH RADICULOPATHY: ICD-10-CM

## 2020-12-08 DIAGNOSIS — M47.816 FACET SYNDROME, LUMBAR: ICD-10-CM

## 2020-12-08 DIAGNOSIS — M51.26 BULGE OF LUMBAR DISC WITHOUT MYELOPATHY: ICD-10-CM

## 2020-12-08 DIAGNOSIS — M79.10 MYALGIA: ICD-10-CM

## 2020-12-08 DIAGNOSIS — M48.062 SPINAL STENOSIS OF LUMBAR REGION WITH NEUROGENIC CLAUDICATION: ICD-10-CM

## 2020-12-08 DIAGNOSIS — M54.59 MECHANICAL LOW BACK PAIN: Primary | ICD-10-CM

## 2020-12-08 DIAGNOSIS — M51.37 DDD (DEGENERATIVE DISC DISEASE), LUMBOSACRAL: ICD-10-CM

## 2020-12-08 DIAGNOSIS — M99.9 BIOMECHANICAL LESION: ICD-10-CM

## 2020-12-08 DIAGNOSIS — M47.816 LUMBAR SPONDYLOSIS: ICD-10-CM

## 2020-12-08 PROCEDURE — 64484 NJX AA&/STRD TFRM EPI L/S EA: CPT | Performed by: PHYSICAL MEDICINE & REHABILITATION

## 2020-12-08 PROCEDURE — 64483 NJX AA&/STRD TFRM EPI L/S 1: CPT | Performed by: PHYSICAL MEDICINE & REHABILITATION

## 2020-12-08 NOTE — PROCEDURES
[address]    2-LEVEL LUMBAR TRANSFORAMINAL   NAME:  Sherra Spurling    MR #:    [id number] :  1940     PHYSICIAN:  Jairo Garcia        Operative Report    DATE OF PROCEDURE: 2020   PREOPERATIVE DIAGNOSES: 1.  Mechanical low to be in good position, AP fluoroscopy was used to advance the needles to the 6 o'clock position on the left L5 and left S1 pedicles.   At this point in time, Omnipaque-240 contrast was used to obtain a good epidurogram indicating correct needle placement a

## 2020-12-10 ENCOUNTER — TELEPHONE (OUTPATIENT)
Dept: NEUROLOGY | Facility: CLINIC | Age: 80
End: 2020-12-10

## 2020-12-10 NOTE — TELEPHONE ENCOUNTER
Spoke with patient and she stated that she has not noticed a difference since injection. Rates pain 5/10. Advised to give injection some time and to call the office if she finds symtoms worsening.

## 2020-12-11 ENCOUNTER — TELEPHONE (OUTPATIENT)
Dept: NEUROLOGY | Facility: CLINIC | Age: 80
End: 2020-12-11

## 2021-12-06 ENCOUNTER — APPOINTMENT (OUTPATIENT)
Dept: URBAN - METROPOLITAN AREA CLINIC 321 | Age: 81
Setting detail: DERMATOLOGY
End: 2021-12-06

## 2021-12-06 PROBLEM — D48.5 NEOPLASM OF UNCERTAIN BEHAVIOR OF SKIN: Status: ACTIVE | Noted: 2021-12-06

## 2021-12-06 PROCEDURE — 11104 PUNCH BX SKIN SINGLE LESION: CPT

## 2021-12-06 PROCEDURE — OTHER BIOPSY BY PUNCH METHOD: OTHER

## 2021-12-06 NOTE — PROCEDURE: BIOPSY BY PUNCH METHOD
Billing Type: Third-Party Bill
Information: Selecting Yes will display possible errors in your note based on the variables you have selected. This validation is only offered as a suggestion for you. PLEASE NOTE THAT THE VALIDATION TEXT WILL BE REMOVED WHEN YOU FINALIZE YOUR NOTE. IF YOU WANT TO FAX A PRELIMINARY NOTE YOU WILL NEED TO TOGGLE THIS TO 'NO' IF YOU DO NOT WANT IT IN YOUR FAXED NOTE.
Consent: Written consent was obtained and risks were reviewed including but not limited to scarring, infection, bleeding, scabbing, incomplete removal, nerve damage and allergy to anesthesia.
Render Path Notes In Note?: No
Notification Instructions: Patient will be notified of biopsy results. However, patient instructed to call the office if not contacted within 2 weeks.
Anesthesia Volume In Cc (Will Not Render If 0): 0.5
Anesthesia Type: 0.5% lidocaine with 1:200,000 epinephrine and a 1:10 solution of 8.4% sodium bicarbonate
Was A Bandage Applied: Yes
Size Of Lesion In Cm (Optional): 0
Wound Care: Petrolatum
Hemostasis: None
Home Suture Removal Text: Patient will remove their sutures at home.  If they have any questions or difficulties they will call the office.
Dressing: bandage
Epidermal Sutures: 6-0 Nylon
Suture Removal: 7 days
Detail Level: Detailed
Punch Size In Mm: 2
Post-Care Instructions: I reviewed with the patient in detail post-care instructions. Patient is to keep the biopsy site dry overnight, and then apply petrolatum twice daily until healed. Patient may apply hydrogen peroxide soaks to remove any crusting.
Biopsy Type: H and E

## 2021-12-13 ENCOUNTER — APPOINTMENT (OUTPATIENT)
Dept: URBAN - METROPOLITAN AREA CLINIC 321 | Age: 81
Setting detail: DERMATOLOGY
End: 2021-12-13

## 2021-12-13 PROBLEM — C44.311 BASAL CELL CARCINOMA OF SKIN OF NOSE: Status: ACTIVE | Noted: 2021-12-13

## 2021-12-13 PROCEDURE — 99214 OFFICE O/P EST MOD 30 MIN: CPT

## 2021-12-13 PROCEDURE — OTHER CONSULTATION FOR MOHS SURGERY: OTHER

## 2021-12-13 NOTE — PROCEDURE: CONSULTATION FOR MOHS SURGERY
Detail Level: Detailed
Incorporate Mauc In Note: No
Date Scheduled For Mohs (Optional): January 4, 2021
Body Location Override (Optional - Billing Will Still Be Based On Selected Body Map Location If Applicable): R nasal Ala
X Size Of Lesion In Cm (Optional): 0
VOMITING/NAUSEA

## 2022-01-04 ENCOUNTER — APPOINTMENT (OUTPATIENT)
Dept: URBAN - METROPOLITAN AREA CLINIC 321 | Age: 82
Setting detail: DERMATOLOGY
End: 2022-01-04

## 2022-01-04 PROBLEM — C44.311 BASAL CELL CARCINOMA OF SKIN OF NOSE: Status: ACTIVE | Noted: 2022-01-04

## 2022-01-04 PROCEDURE — 17311 MOHS 1 STAGE H/N/HF/G: CPT

## 2022-01-04 PROCEDURE — 15260 FTH/GFT FR N/E/E/L 20 SQCM/<: CPT

## 2022-01-04 PROCEDURE — 17312 MOHS ADDL STAGE: CPT

## 2022-01-04 PROCEDURE — OTHER MOHS SURGERY: OTHER

## 2022-01-11 ENCOUNTER — APPOINTMENT (OUTPATIENT)
Dept: URBAN - METROPOLITAN AREA CLINIC 321 | Age: 82
Setting detail: DERMATOLOGY
End: 2022-01-12

## 2022-01-11 DIAGNOSIS — Z48.02 ENCOUNTER FOR REMOVAL OF SUTURES: ICD-10-CM

## 2022-01-11 PROCEDURE — OTHER SUTURE REMOVAL (GLOBAL PERIOD): OTHER

## 2022-01-11 ASSESSMENT — LOCATION SIMPLE DESCRIPTION DERM
LOCATION SIMPLE: RIGHT NOSE
LOCATION SIMPLE: NECK

## 2022-01-11 ASSESSMENT — LOCATION DETAILED DESCRIPTION DERM
LOCATION DETAILED: RIGHT SUPERIOR LATERAL NECK
LOCATION DETAILED: RIGHT NASAL SIDEWALL

## 2022-01-11 ASSESSMENT — LOCATION ZONE DERM
LOCATION ZONE: NOSE
LOCATION ZONE: NECK

## 2022-01-11 NOTE — PROCEDURE: SUTURE REMOVAL (GLOBAL PERIOD)
Add 26481 Cpt? (Important Note: In 2017 The Use Of 62162 Is Being Tracked By Cms To Determine Future Global Period Reimbursement For Global Periods): no
Detail Level: Detailed

## 2022-04-04 ENCOUNTER — APPOINTMENT (OUTPATIENT)
Dept: URBAN - METROPOLITAN AREA CLINIC 244 | Age: 82
Setting detail: DERMATOLOGY
End: 2022-04-05

## 2022-04-04 DIAGNOSIS — L91.0 HYPERTROPHIC SCAR: ICD-10-CM

## 2022-04-04 DIAGNOSIS — L81.4 OTHER MELANIN HYPERPIGMENTATION: ICD-10-CM

## 2022-04-04 DIAGNOSIS — D22 MELANOCYTIC NEVI: ICD-10-CM

## 2022-04-04 PROBLEM — D22.39 MELANOCYTIC NEVI OF OTHER PARTS OF FACE: Status: ACTIVE | Noted: 2022-04-04

## 2022-04-04 PROCEDURE — OTHER COUNSELING: OTHER

## 2022-04-04 ASSESSMENT — LOCATION DETAILED DESCRIPTION DERM
LOCATION DETAILED: RIGHT NASAL ALA
LOCATION DETAILED: LEFT CENTRAL MALAR CHEEK
LOCATION DETAILED: RIGHT INFERIOR MEDIAL FOREHEAD

## 2022-04-04 ASSESSMENT — LOCATION SIMPLE DESCRIPTION DERM
LOCATION SIMPLE: RIGHT NOSE
LOCATION SIMPLE: RIGHT FOREHEAD
LOCATION SIMPLE: LEFT CHEEK

## 2022-04-04 ASSESSMENT — LOCATION ZONE DERM
LOCATION ZONE: FACE
LOCATION ZONE: NOSE

## 2022-04-04 NOTE — HPI: NON-MELANOMA SKIN CANCER F/U (HISTORY OF NMSC)
What Is The Reason For Today's Visit?: History of Non-Melanoma Skin Cancer
How Many Skin Cancers Have You Had?: one
When Was Your Last Cancer Diagnosed?: 12/6/2021

## 2023-08-30 ENCOUNTER — HOSPITAL ENCOUNTER (OUTPATIENT)
Age: 83
Discharge: HOME | End: 2023-08-30
Payer: MEDICARE

## 2023-08-30 VITALS
SYSTOLIC BLOOD PRESSURE: 146 MMHG | DIASTOLIC BLOOD PRESSURE: 79 MMHG | OXYGEN SATURATION: 96 % | TEMPERATURE: 98 F | RESPIRATION RATE: 20 BRPM | HEART RATE: 65 BPM

## 2023-08-30 DIAGNOSIS — M79.89 LEFT LEG SWELLING: Primary | ICD-10-CM

## 2023-08-30 PROCEDURE — 99203 OFFICE O/P NEW LOW 30 MIN: CPT

## 2023-08-30 PROCEDURE — 99202 OFFICE O/P NEW SF 15 MIN: CPT

## 2023-08-30 NOTE — ED INITIAL ASSESSMENT (HPI)
Patient reports 5-6 weeks of swelling of her left lower leg. Us done through her pcp. Was negative for dvt. Pcp treated her with antibiotics for phlebitis and symptoms did not improve. Saw vascular surgery on Friday who recommended a ct scan and doppler be done. Patient states she can not have these tests performed until September 22nd. States she has continued pain to her left lower leg and is warm red and swollen. Denies improvement in pain even after increasing her gabapentin dose.

## 2023-10-23 ENCOUNTER — ORDER TRANSCRIPTION (OUTPATIENT)
Dept: PHYSICAL THERAPY | Facility: HOSPITAL | Age: 83
End: 2023-10-23

## 2023-10-23 DIAGNOSIS — L03.90 CELLULITIS: Primary | ICD-10-CM

## 2023-11-06 ENCOUNTER — TELEPHONE (OUTPATIENT)
Dept: PHYSICAL THERAPY | Facility: HOSPITAL | Age: 83
End: 2023-11-06

## 2024-09-20 NOTE — PROCEDURE: MOHS SURGERY
alert Bilobed Transposition Flap Text: The defect edges were debeveled with a #15 scalpel blade.  Given the location of the defect and the proximity to free margins a bilobed transposition flap was deemed most appropriate.  Using a sterile surgical marker, an appropriate bilobe flap drawn around the defect.    The area thus outlined was incised deep to adipose tissue with a #15 scalpel blade.  The skin margins were undermined to an appropriate distance in all directions utilizing iris scissors.

## (undated) NOTE — LETTER
CATHRYN Notifier: Maulik/Programmr   MARI. Patient Name: Sheldon Goldmann. Identification Number: [id number]      Advance Beneficiary Notice of Noncoverage (ABN)  NOTE:  If Medicare doesn’t pay for D. item/service below, you may have to pay.   Medicare does not ? OPTION 2. I want the D. item/service listed above, but do not bill Medicare. You may ask to be paid now as I am responsible for payment. I cannot appeal if Medicare is not billed. ? OPTION 3. I don’t want the D. item/service listed above.  I understand

## (undated) NOTE — LETTER
[facility] SURGERY SCHEDULING FORM   [address] [place] [address]   [phone] (scheduling phone) [phone] (scheduling fax)     PATIENT INFORMATION   Last Name:      Carol Singleton      First Name:    Jarod Gee []  No or using our own   Allergies: Codeine         Completed by:    Rebeka Wells      Date:    12/2/2020

## (undated) NOTE — LETTER
[facility] SURGERY SCHEDULING FORM   [address] [facility]   [phone] (scheduling phone) [phone] (scheduling fax)     PATIENT INFORMATION   Last Name:      Dion Plaza      First Name:    Meaghan Dale Allergies: Codeine         Completed by:    Kia Berg      Date:    9/22/2020

## (undated) NOTE — LETTER
AUTHORIZATION FOR SURGICAL OPERATION OR OTHER PROCEDURE    1.  I hereby authorize Dr. Nata Cordova and the [facility] staff assigned to my case to perform the following operation and/or procedure at the [facility]:     LEFT SI joint and myofacial trigger poin Relationship to Patient:           []  Parent    Responsible person                          []  Spouse  In case of minor or                    [] Other  _____________   Incompetent name:  __________________________________________________

## (undated) NOTE — LETTER
[facility] SURGERY SCHEDULING FORM   [address] [place] [place] [address] [facility]   [phone] (scheduling phone) [phone] (scheduling fax)     PATIENT INFORMATION   Last Name:      Greyson Tony      First Name:    Alvaro Laguerre []  No or using our own   Allergies: Codeine         Completed by:    Taurus Torres      Date:    11/3/2020